# Patient Record
Sex: MALE | Race: OTHER | NOT HISPANIC OR LATINO | URBAN - METROPOLITAN AREA
[De-identification: names, ages, dates, MRNs, and addresses within clinical notes are randomized per-mention and may not be internally consistent; named-entity substitution may affect disease eponyms.]

---

## 2024-03-25 ENCOUNTER — OV NP (OUTPATIENT)
Dept: URBAN - METROPOLITAN AREA CLINIC 40 | Facility: CLINIC | Age: 44
End: 2024-03-25
Payer: COMMERCIAL

## 2024-03-25 VITALS
DIASTOLIC BLOOD PRESSURE: 18 MMHG | BODY MASS INDEX: 32.98 KG/M2 | TEMPERATURE: 96.6 F | HEART RATE: 101 BPM | HEIGHT: 74 IN | WEIGHT: 257 LBS | SYSTOLIC BLOOD PRESSURE: 160 MMHG

## 2024-03-25 DIAGNOSIS — K58.0 IRRITABLE BOWEL SYNDROME WITH DIARRHEA: ICD-10-CM

## 2024-03-25 DIAGNOSIS — R10.9 ABDOMINAL CRAMPS: ICD-10-CM

## 2024-03-25 DIAGNOSIS — F43.10 PTSD (POST-TRAUMATIC STRESS DISORDER): ICD-10-CM

## 2024-03-25 PROBLEM — 47505003: Status: ACTIVE | Noted: 2024-03-25

## 2024-03-25 PROBLEM — 197125005: Status: ACTIVE | Noted: 2024-03-25

## 2024-03-25 PROCEDURE — 99204 OFFICE O/P NEW MOD 45 MIN: CPT | Performed by: INTERNAL MEDICINE

## 2024-03-25 RX ORDER — DICYCLOMINE HYDROCHLORIDE 20 MG/1
TAKE 1 TABLET BY ORAL ROUTE 2 TIMES A DAY FOR 30 DAYS TABLET ORAL 2
Qty: 60 | Refills: 0 | Status: ON HOLD | COMMUNITY
Start: 2017-10-26

## 2024-03-25 RX ORDER — LISINOPRIL 10 MG/1
TAKE ONE TABLET BY MOUTH ONE TIME DAILY TABLET ORAL
Qty: 90 UNSPECIFIED | Refills: 0 | Status: ACTIVE | COMMUNITY

## 2024-03-25 RX ORDER — RIFAXIMIN 550 MG/1
1 TABLET TABLET ORAL THREE TIMES A DAY
Qty: 42 TABLET | Refills: 2 | OUTPATIENT
Start: 2024-03-25 | End: 2024-05-06

## 2024-03-25 RX ORDER — CLONAZEPAM 1 MG/1
TAKE HALF TO ONE WHOLE TABLET BY MOUTH EVERY 8 HOURS AS NEEDED FOR ANXIETY TABLET ORAL
Qty: 90 UNSPECIFIED | Refills: 0 | Status: ACTIVE | COMMUNITY

## 2024-03-25 RX ORDER — SUCRALFATE 1 G/1
TAKE 1 TABLET (1 GRAM) BY ORAL ROUTE 2 TIMES PER DAY ON AN EMPTY STOMACH FOR 30 DAYS TABLET ORAL 2
Qty: 60 | Refills: 0 | Status: ON HOLD | COMMUNITY
Start: 2017-10-26

## 2024-03-25 RX ORDER — AMLODIPINE BESYLATE 10 MG/1
TAKE ONE TABLET BY MOUTH ONE TIME DAILY TABLET ORAL
Qty: 90 UNSPECIFIED | Refills: 0 | Status: ACTIVE | COMMUNITY

## 2024-03-25 NOTE — HPI-TODAY'S VISIT:
Patient presents for evaluation of irritable bowel syndrome. He has a longstanding history of diarrhea predominant irritable bowel syndrome secondary to anxiety secondary to PTSD.  This is in his own words. He was seen here in 2017 for evaluation of abdominal pain that was found to be secondary to kidney stone.  No prior colonoscopy or endoscopy. Known history of PTSD with delusional disorder.  Secondary to  service per patient.  [facility]  He also has lower back pain spinal stenosis.  Frequent diarrhea and anxiety mainly associated with his anxiety in social situations due to PTSD.  He gets anxious and then has abdominal cramps with urge to defecate, generally loose watery stools and then relief after defecation.  He denies rectal bleeding or other red flag symptoms.  This causes him significant stress which worsens his PTSD symptoms.  His weight is otherwise stable.